# Patient Record
Sex: FEMALE | Race: WHITE | NOT HISPANIC OR LATINO | Employment: FULL TIME | URBAN - METROPOLITAN AREA
[De-identification: names, ages, dates, MRNs, and addresses within clinical notes are randomized per-mention and may not be internally consistent; named-entity substitution may affect disease eponyms.]

---
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
## 2020-06-02 ENCOUNTER — OFFICE VISIT (OUTPATIENT)
Dept: FAMILY MEDICINE | Facility: CLINIC | Age: 20
End: 2020-06-02
Payer: COMMERCIAL

## 2020-06-02 VITALS
OXYGEN SATURATION: 98 % | SYSTOLIC BLOOD PRESSURE: 110 MMHG | BODY MASS INDEX: 29.85 KG/M2 | HEIGHT: 66 IN | WEIGHT: 185.75 LBS | TEMPERATURE: 98 F | DIASTOLIC BLOOD PRESSURE: 76 MMHG | HEART RATE: 58 BPM

## 2020-06-02 DIAGNOSIS — M79.641 PAIN OF RIGHT HAND: Primary | ICD-10-CM

## 2020-06-02 PROCEDURE — 99214 PR OFFICE/OUTPT VISIT, EST, LEVL IV, 30-39 MIN: ICD-10-PCS | Mod: S$GLB,,, | Performed by: NURSE PRACTITIONER

## 2020-06-02 PROCEDURE — 3008F BODY MASS INDEX DOCD: CPT | Mod: CPTII,S$GLB,, | Performed by: NURSE PRACTITIONER

## 2020-06-02 PROCEDURE — 3008F PR BODY MASS INDEX (BMI) DOCUMENTED: ICD-10-PCS | Mod: CPTII,S$GLB,, | Performed by: NURSE PRACTITIONER

## 2020-06-02 PROCEDURE — 99214 OFFICE O/P EST MOD 30 MIN: CPT | Mod: S$GLB,,, | Performed by: NURSE PRACTITIONER

## 2020-06-02 RX ORDER — METHYLPREDNISOLONE 4 MG/1
TABLET ORAL
Qty: 1 PACKAGE | Refills: 0 | Status: SHIPPED | OUTPATIENT
Start: 2020-06-02 | End: 2020-06-23

## 2020-06-02 NOTE — PROGRESS NOTES
"Subjective:       Patient ID: Jessica Womack is a 20 y.o. female.    Chief Complaint: Hand Injury (right)    Hand Pain    The incident occurred more than 1 week ago. The incident occurred at home. Injury mechanism: working out at home. The pain is present in the right wrist and right hand. The quality of the pain is described as aching and shooting. The pain does not radiate. The symptoms are aggravated by movement, lifting and palpation. She has tried NSAIDs and ice for the symptoms.     Review of Systems   Constitutional: Negative for appetite change, chills and fever.   HENT: Negative for ear pain and postnasal drip.    Eyes: Negative for pain and itching.   Respiratory: Negative for chest tightness and shortness of breath.    Gastrointestinal: Negative for abdominal distention and abdominal pain.   Endocrine: Negative for polydipsia and polyuria.   Genitourinary: Negative for difficulty urinating and flank pain.   Musculoskeletal: Positive for arthralgias.   Skin: Negative for color change and pallor.   Neurological: Negative for light-headedness and headaches.   Hematological: Negative for adenopathy. Does not bruise/bleed easily.   Psychiatric/Behavioral: Negative for agitation.       Past medical, surgical, family and social history reviewed.  Objective:     Vitals:    06/02/20 1553   BP: 110/76   Pulse: (!) 58   Temp: 97.5 °F (36.4 °C)   TempSrc: Oral   SpO2: 98%   Weight: 84.2 kg (185 lb 11.8 oz)   Height: 5' 6" (1.676 m)   PainSc: 0-No pain     Body mass index is 29.98 kg/m².     Physical Exam   Constitutional: She is oriented to person, place, and time. She appears well-developed and well-nourished.   HENT:   Head: Normocephalic and atraumatic.   Right Ear: External ear normal.   Left Ear: External ear normal.   Nose: Nose normal.   Mouth/Throat: Oropharynx is clear and moist.   Eyes: Conjunctivae are normal. Right eye exhibits no discharge. Left eye exhibits no discharge. No scleral icterus. "   Neck: Normal range of motion. Neck supple. No tracheal deviation present.   Cardiovascular: Normal rate, regular rhythm and normal heart sounds. Exam reveals no friction rub.   No murmur heard.  Pulmonary/Chest: Effort normal and breath sounds normal. No stridor. No respiratory distress. She has no wheezes. She has no rales. She exhibits no tenderness.   Musculoskeletal: Normal range of motion.        Right wrist: She exhibits tenderness.   Right thumb stiff, pain over wrist with palpation and when flexed   Lymphadenopathy:     She has no cervical adenopathy.   Neurological: She is alert and oriented to person, place, and time.   Skin: Skin is warm and dry.   Psychiatric: She has a normal mood and affect.       Assessment:       1. Pain of right hand        Plan:       Jessica was seen today for hand injury.    Diagnoses and all orders for this visit:    Pain of right hand-most likely a strain from weight-bearing exercises at home.  We will give a Medrol Dosepak and if pain does not resolve she will follow-up with Dr. Thanh Santos    -     methylPREDNISolone (MEDROL DOSEPACK) 4 mg tablet; use as directed  -     Ambulatory referral/consult to Orthopedics; Future

## 2020-07-01 ENCOUNTER — PATIENT OUTREACH (OUTPATIENT)
Dept: ADMINISTRATIVE | Facility: OTHER | Age: 20
End: 2020-07-01

## 2020-07-06 ENCOUNTER — OFFICE VISIT (OUTPATIENT)
Dept: ORTHOPEDICS | Facility: CLINIC | Age: 20
End: 2020-07-06
Payer: COMMERCIAL

## 2020-07-06 VITALS
HEART RATE: 58 BPM | HEIGHT: 66 IN | TEMPERATURE: 99 F | SYSTOLIC BLOOD PRESSURE: 111 MMHG | DIASTOLIC BLOOD PRESSURE: 64 MMHG | BODY MASS INDEX: 28.63 KG/M2 | WEIGHT: 178.13 LBS

## 2020-07-06 DIAGNOSIS — M79.641 PAIN OF RIGHT HAND: ICD-10-CM

## 2020-07-06 DIAGNOSIS — G56.21 ULNAR NEUROPATHY OF RIGHT UPPER EXTREMITY: Primary | ICD-10-CM

## 2020-07-06 PROCEDURE — 99999 PR PBB SHADOW E&M-EST. PATIENT-LVL IV: CPT | Mod: PBBFAC,,, | Performed by: ORTHOPAEDIC SURGERY

## 2020-07-06 PROCEDURE — 3008F PR BODY MASS INDEX (BMI) DOCUMENTED: ICD-10-PCS | Mod: CPTII,S$GLB,, | Performed by: ORTHOPAEDIC SURGERY

## 2020-07-06 PROCEDURE — 99203 OFFICE O/P NEW LOW 30 MIN: CPT | Mod: S$GLB,,, | Performed by: ORTHOPAEDIC SURGERY

## 2020-07-06 PROCEDURE — 3008F BODY MASS INDEX DOCD: CPT | Mod: CPTII,S$GLB,, | Performed by: ORTHOPAEDIC SURGERY

## 2020-07-06 PROCEDURE — 99203 PR OFFICE/OUTPT VISIT, NEW, LEVL III, 30-44 MIN: ICD-10-PCS | Mod: S$GLB,,, | Performed by: ORTHOPAEDIC SURGERY

## 2020-07-06 PROCEDURE — 99999 PR PBB SHADOW E&M-EST. PATIENT-LVL IV: ICD-10-PCS | Mod: PBBFAC,,, | Performed by: ORTHOPAEDIC SURGERY

## 2020-07-06 RX ORDER — CLINDAMYCIN PHOSPHATE 10 MG/ML
SOLUTION TOPICAL
COMMUNITY
Start: 2020-06-23 | End: 2021-03-09

## 2020-07-06 NOTE — LETTER
July 7, 2020      [place]  [address]  [facility] [address]           [facility]  [address] [place] [address]  Phone: [phone]          Patient: Jessica Womack   MR Number: [id number]   YOB: 2000   Date of Visit: 7/6/2020       Dear Camryn Ozuna:    Thank you for referring Jessica Womack to me for evaluation. Attached you will find relevant portions of my assessment and plan of care.    If you have questions, please do not hesitate to call me. I look forward to following Jessica Womack along with you.    Sincerely,    Thanh Santos MD    Enclosure  CC:  No Recipients    If you would like to receive this communication electronically, please contact [email or web address] or [phone] to request more information on [employer] Link access.    For providers and/or their staff who would like to refer a patient to [facility], please contact us through our one-stop-shop provider referral line, [facility] , at [phone].    If you feel you have received this communication in error or would no longer like to receive these types of communications, please e-mail [email or web address]

## 2020-07-06 NOTE — PROGRESS NOTES
7/6/2020    Chief Complaint:  Chief Complaint   Patient presents with    Right Hand Numbness onset 3/2020     pt reports she began experiencing this symptom while working out       HPI:  Jessica Womack is a 20 y.o. female, who presents to clinic today she has history of right ring and small finger numbness and tingling as well as weakness of her small finger.  She states that this began approximately 3-4 months ago.  States that it 1st began after she was working out and putting a lot of pressure on her hand.  She states that initially she had symptoms only with direct pressure but now she was having symptoms more consistently.  She has not had any treatment or evaluation.  She has no other complaints    PMHX:  No past medical history on file.    PSHX:  No past surgical history on file.    FMHX:  No family history on file.    SOCHX:  Social History     Tobacco Use    Smoking status: Never Smoker    Smokeless tobacco: Never Used   Substance Use Topics    Alcohol use: Never     Frequency: Never       ALLERGIES:  Patient has no known allergies.    CURRENT MEDICATIONS:  Current Outpatient Medications on File Prior to Visit   Medication Sig Dispense Refill    clindamycin (CLEOCIN T) 1 % Swab APPLY TO BACK AND CHEST ACNE PRN      clotrimazole-betamethasone 1-0.05% (LOTRISONE) cream NEGRO EXT AA BID  1    fluconazole (DIFLUCAN) 150 MG Tab   0     No current facility-administered medications on file prior to visit.        REVIEW OF SYSTEMS:  Review of Systems   Constitutional: Negative.    HENT: Negative.    Eyes: Negative.    Respiratory: Negative.    Cardiovascular: Negative.    Gastrointestinal: Negative.    Genitourinary: Negative.    Musculoskeletal: Negative.    Skin: Negative.    Neurological: Positive for tingling. Negative for dizziness, tremors, sensory change, speech change, focal weakness, seizures, loss of consciousness, weakness and headaches.   Endo/Heme/Allergies: Negative.   "  Psychiatric/Behavioral: Negative.        GENERAL PHYSICAL EXAM:   /64   Pulse (!) 58   Temp 98.8 °F (37.1 °C)   Ht 5' 6" (1.676 m)   Wt 80.8 kg (178 lb 2.1 oz)   LMP 06/10/2020 (Approximate)   BMI 28.75 kg/m²    GEN: well developed, well nourished, no acute distress   HENT: Normocephalic, atraumatic   EYES: No discharge, conjunctiva normal   NECK: Supple, non-tender   PULM: No wheezing, no respiratory distress   CV: RRR   ABD: Soft, non-tender    ORTHO EXAM:   Examination the right upper extremity reveals that there is no edema.  There are no skin changes.  Palpation produces no tenderness.  She does report decreased sensation in the ulnar distribution.  Median and radial sensation is intact.  She does have mild weakness of the hypothenar musculature but the remainder of the intrinsics appear to be intact.  She has a negative Tinel's overlying the cubital tunnel but a positive Tinel's overlying Guyon's canal.  She does have a 2+ radial pulse.    RADIOLOGY:   None    ASSESSMENT:   Right wrist Guyon's syndrome    PLAN:  1.  I will send the patient for an EMG and nerve conduction study    2.  I have taught her ergonomic changes to prevent compression of the nerve both at the wrist and at the elbow    3.  She will follow up with me as soon as the nerve conduction test is completed for discussion of treatment options      "

## 2020-07-15 ENCOUNTER — OFFICE VISIT (OUTPATIENT)
Dept: URGENT CARE | Facility: CLINIC | Age: 20
End: 2020-07-15
Payer: COMMERCIAL

## 2020-07-15 DIAGNOSIS — U07.1 COVID-19: ICD-10-CM

## 2020-07-15 DIAGNOSIS — Z20.822 CLOSE EXPOSURE TO COVID-19 VIRUS: Primary | ICD-10-CM

## 2020-07-15 PROCEDURE — U0003 INFECTIOUS AGENT DETECTION BY NUCLEIC ACID (DNA OR RNA); SEVERE ACUTE RESPIRATORY SYNDROME CORONAVIRUS 2 (SARS-COV-2) (CORONAVIRUS DISEASE [COVID-19]), AMPLIFIED PROBE TECHNIQUE, MAKING USE OF HIGH THROUGHPUT TECHNOLOGIES AS DESCRIBED BY CMS-2020-01-R: HCPCS

## 2020-07-15 PROCEDURE — 99201 PR OFFICE/OUTPT VISIT,NEW,LEVL I: ICD-10-PCS | Mod: S$GLB,,, | Performed by: FAMILY MEDICINE

## 2020-07-15 PROCEDURE — 99201 PR OFFICE/OUTPT VISIT,NEW,LEVL I: CPT | Mod: S$GLB,,, | Performed by: FAMILY MEDICINE

## 2020-07-15 NOTE — PROGRESS NOTES
Pt here for COVID test.    pt was exposure by [place] yesterday. Counseled patient and answered questions in regards to COVID-19 testing process/procedure and/or diagnosis. Patient v/u

## 2020-07-19 ENCOUNTER — TELEPHONE (OUTPATIENT)
Dept: URGENT CARE | Facility: CLINIC | Age: 20
End: 2020-07-19

## 2020-07-19 LAB — SARS-COV-2 RNA RESP QL NAA+PROBE: NOT DETECTED

## 2020-07-19 NOTE — TELEPHONE ENCOUNTER
Gave pt. Results .SR    ----- Message from Iris So PA-C sent at 7/19/2020  4:10 PM CDT -----  Please call patient in regards to their COVID-19 (NOT DETECTED) results. Please advise patient to continue CDC precautions as the test could be a false negative result because our test is currently only about 80% accurate. Advise patient to practice social distancing and to wear a mask if in close contact and in public to limit any future exposure. If they develop any symptoms, please advise patient to seek medical care.

## 2020-07-19 NOTE — PROGRESS NOTES
Please call patient in regards to their COVID-19 (NOT DETECTED) results. Please advise patient to continue CDC precautions as the test could be a false negative result because our test is currently only about 80% accurate. Advise patient to practice social distancing and to wear a mask if in close contact and in public to limit any future exposure. If they develop any symptoms, please advise patient to seek medical care.

## 2020-07-21 ENCOUNTER — PATIENT OUTREACH (OUTPATIENT)
Dept: ADMINISTRATIVE | Facility: OTHER | Age: 20
End: 2020-07-21

## 2020-07-21 ENCOUNTER — OCCUPATIONAL HEALTH (OUTPATIENT)
Dept: URGENT CARE | Facility: CLINIC | Age: 20
End: 2020-07-21
Payer: COMMERCIAL

## 2020-07-21 DIAGNOSIS — U07.1 COVID-19: ICD-10-CM

## 2020-07-21 PROCEDURE — U0003 INFECTIOUS AGENT DETECTION BY NUCLEIC ACID (DNA OR RNA); SEVERE ACUTE RESPIRATORY SYNDROME CORONAVIRUS 2 (SARS-COV-2) (CORONAVIRUS DISEASE [COVID-19]), AMPLIFIED PROBE TECHNIQUE, MAKING USE OF HIGH THROUGHPUT TECHNOLOGIES AS DESCRIBED BY CMS-2020-01-R: HCPCS

## 2020-07-23 ENCOUNTER — OFFICE VISIT (OUTPATIENT)
Dept: PHYSICAL MEDICINE AND REHAB | Facility: CLINIC | Age: 20
End: 2020-07-23
Payer: COMMERCIAL

## 2020-07-23 DIAGNOSIS — G56.20 ULNAR NEUROPATHY AT ELBOW, UNSPECIFIED LATERALITY: Primary | ICD-10-CM

## 2020-07-23 DIAGNOSIS — G56.03 CARPAL TUNNEL SYNDROME ON BOTH SIDES: ICD-10-CM

## 2020-07-23 LAB — SARS-COV-2 RNA RESP QL NAA+PROBE: NOT DETECTED

## 2020-07-23 PROCEDURE — 99999 PR PBB SHADOW E&M-EST. PATIENT-LVL I: ICD-10-PCS | Mod: PBBFAC,,, | Performed by: PHYSICAL MEDICINE & REHABILITATION

## 2020-07-23 PROCEDURE — 95911 PR NERVE CONDUCTION STUDY; 9-10 STUDIES: ICD-10-PCS | Mod: S$GLB,,, | Performed by: PHYSICAL MEDICINE & REHABILITATION

## 2020-07-23 PROCEDURE — 95911 NRV CNDJ TEST 9-10 STUDIES: CPT | Mod: S$GLB,,, | Performed by: PHYSICAL MEDICINE & REHABILITATION

## 2020-07-23 PROCEDURE — 99999 PR PBB SHADOW E&M-EST. PATIENT-LVL I: CPT | Mod: PBBFAC,,, | Performed by: PHYSICAL MEDICINE & REHABILITATION

## 2020-07-23 PROCEDURE — 95886 PR EMG COMPLETE, W/ NERVE CONDUCTION STUDIES, 5+ MUSCLES: ICD-10-PCS | Mod: S$GLB,,, | Performed by: PHYSICAL MEDICINE & REHABILITATION

## 2020-07-23 PROCEDURE — 95886 MUSC TEST DONE W/N TEST COMP: CPT | Mod: S$GLB,,, | Performed by: PHYSICAL MEDICINE & REHABILITATION

## 2020-07-23 PROCEDURE — 99499 UNLISTED E&M SERVICE: CPT | Mod: S$GLB,,, | Performed by: PHYSICAL MEDICINE & REHABILITATION

## 2020-07-23 PROCEDURE — 99499 NO LOS: ICD-10-PCS | Mod: S$GLB,,, | Performed by: PHYSICAL MEDICINE & REHABILITATION

## 2020-07-23 NOTE — PROGRESS NOTES
[facility]  [address] [facility]  [place] [address]             Full Name: Jessica Womack Gender: Female  Patient ID: [id number] YOB: 2000  History: 20 year-old female referred by Dr. Santos for evaluation of ulnar neuropathy. She reports 4 months of numbness and weakness of her fourth and fifth digits of the bilateral hands R>L which started when weightlifting. There has been no significant change in severity of symptoms since onset. She denies any symptoms in the neck and upper arm.       Visit Date: 7/23/2020 08:40  Age: 20 Years 3 Months Old  Examining Physician: Dr. Lopes  Referring Physician: Dr. Santos  Height: 5 feet 6 inch      Sensory NCS      Nerve / Sites Rec. Site Onset Lat Peak Lat NP Amp PP Amp Segments Distance Velocity     ms ms µV µV  cm m/s   L Median - Digit III (Antidromic)      Wrist Dig III 3.49 4.38 25.7 47.7 Wrist - Dig III 14 40      Mid palm Dig III 1.25 1.82 45.0 79.2 Mid palm - Dig III 7 56   R Median - Digit III (Antidromic)      Wrist Dig III 3.44 4.17 23.9 48.5 Wrist - Dig III 14 41      Mid palm Dig III 1.30 1.77 33.5 65.6 Mid palm - Dig III 7 54   L Ulnar - Digit V (Antidromic)      Wrist Dig V 4.11 5.00 8.4 20.8 Wrist - Dig V 14 34   R Ulnar - Digit V (Antidromic)      Wrist Dig V 4.17 5.73 7.8 14.8 Wrist - Dig V 14 34   R Dorsal ulnar cutaneous - Hand dorsum (Forearm)      Forearm Hand dorsum NR NR NR NR Forearm - Hand dorsum 8 NR   L Dorsal ulnar cutaneous - Hand dorsum (Forearm)      Forearm Hand dorsum 4.79 5.42 18.4 8.6 Forearm - Hand dorsum 8 17       Motor NCS      Nerve / Sites Muscle Latency Amplitude Amp % Duration Segments Distance Lat Diff Velocity     ms mV % ms  cm ms m/s   R Median - APB      Wrist APB 4.84 9.4 100 6.41 Wrist - APB 8        Elbow APB 9.48 9.5 101 6.51 Elbow - Wrist 23 4.64 50   L Median - APB      Wrist APB 4.64 11.6 100 7.19 Wrist - APB 8        Elbow APB 9.01 10.1 87.5 7.34 Elbow - Wrist 23 4.37 53   R Ulnar - ADM       Wrist ADM 3.96 7.4 100 8.65 Wrist - ADM 8        B.Elbow ADM 7.60 7.0 94.2 8.70 B.Elbow - Wrist 18 3.65 49      A.Elbow ADM 10.36 6.0 81 9.06 A.Elbow - B.Elbow 10 2.76 36   L Ulnar - ADM      Wrist ADM 3.96 11.0 100 7.50 Wrist - ADM 8        B.Elbow ADM 6.98 10.5 95.4 7.76 B.Elbow - Wrist 17 3.02 56      A.Elbow ADM 10.89 6.2 56.2 7.66 A.Elbow - B.Elbow 10 3.91 26   R Ulnar - FDI      Wrist FDI 5.26 5.6 100 6.72 Wrist - FDI 11        B.Elbow FDI 9.79 4.8 86.8 5.89 B.Elbow - Wrist 18 4.53 40      A.Elbow FDI 13.02 4.2 75.1 5.63 A.Elbow - B.Elbow 10 3.23 31       EMG         EMG Summary Table     Spontaneous MUAP Recruitment   Muscle IA Fib PSW Fasc H.F. Amp Dur. PPP Pattern   R. Deltoid N None None None None N N N N   R. Biceps brachii N None None None None N N N N   R. Triceps brachii N None None None None N N N N   R. Pronator teres N None None None None N N N N   R. First dorsal interosseous 2+ None 2+ None None 1+ 1+ N N   R. Abductor pollicis brevis N None None None None N N N N   R. Flexor carpi ulnaris 1+ None 1+ None None N N N N   L. First dorsal interosseous 1+ None 1+ None None N 1+ N N       Summary    The motor conduction test had results outside of the specified normal range in all 5 of the tested nerves:   In the R Median - APB study  o the take off latency result was increased for Wrist stimulation   In the L Median - APB study  o the take off latency result was increased for Wrist stimulation   In the R Ulnar - ADM study  o the take off latency result was increased for Wrist stimulation  o the take off velocity result was reduced for A.Elbow - B.Elbow segment   In the L Ulnar - ADM study  o the take off latency result was increased for Wrist stimulation  o the take off velocity result was reduced for A.Elbow - B.Elbow segment   In the R Ulnar - FDI study  o the take off velocity result was reduced for B.Elbow - Wrist segment  o the take off velocity result was reduced for A.Elbow - B.Elbow  segment    The sensory conduction test had results outside of the specified normal range in all 6 of the tested nerves:   In the L Median - Digit III (Antidromic) study  o the peak latency result was increased for Wrist stimulation   In the R Median - Digit III (Antidromic) study  o the peak latency result was increased for Wrist stimulation   In the L Ulnar - Digit V (Antidromic) study  o the peak latency result was increased for Wrist stimulation  o the peak amplitude result was reduced for Wrist stimulation   In the R Ulnar - Digit V (Antidromic) study  o the peak latency result was increased for Wrist stimulation  o the peak amplitude result was reduced for Wrist stimulation   In the R Dorsal ulnar cutaneous - Hand dorsum (Forearm) study  o the response was considered absent for Forearm stimulation   In the L Dorsal ulnar cutaneous - Hand dorsum (Forearm) study  o the peak latency result was increased for Forearm stimulation    The needle EMG examination was performed in 8 muscles. It was normal in 5 muscle(s): R. Deltoid, R. Biceps brachii, R. Triceps brachii, R. Pronator teres, R. Abductor pollicis brevis. The study was abnormal in 3 muscle(s), with the following distribution:   Abnormal spontaneous/insertional activity was found in R. First dorsal interosseous, R. Flexor carpi ulnaris, L. First dorsal interosseous.   The MUP waveform abnormality was found in R. First dorsal interosseous, L. First dorsal interosseous.      Electrodiagnostic Impression:  1. There is electrodiagnostic evidence of mild to moderate bilateral median nerve neuropathy at both wrists. Needle EMG examination of the median nerve innervated abductor pollicis brevis muscle on the right failed to show signs of active axonal denervation.  2. There is also evidence of right greater than left, axonal and demyelinating ulnar neuropathy at both elbows.  Needle EMG examination of the ulnar nerve innervated 1st dorsal interosseous muscle  on both sides did show signs of active axonal denervation, and chronic neurogenic change.  3. The presence of significant ulnar neuropathy at the elbow does increase the technical challenge in terms of discerning for overlapping/concurrent ulnar neuropathy at the wrists.  As such, the presence or absence of distal ulnar neuropathy at the wrists is unable to be determined on today's electrodiagnostic testing.  4. There was insufficient electrodiagnostic evidence for diagnoses of myopathy or active axonal cervical radiculopathy/brachial plexopathy of the right upper extremity.    Summary:  1. Mild to moderate bilateral carpal tunnel syndrome.  2. Bilateral ulnar neuropathy at the elbows with active axonal denervation.    Plan:  Today's test results will be sent to her referring physician, Dr. Santos, for further review and direction in her treatment.    Thank you very much for the referral. Please call if you have any questions regarding this study or the report.       -------------------------------  Aleks Lopes M.D.

## 2020-07-24 ENCOUNTER — TELEPHONE (OUTPATIENT)
Dept: URGENT CARE | Facility: CLINIC | Age: 20
End: 2020-07-24

## 2020-07-24 NOTE — TELEPHONE ENCOUNTER
Pt reviewed test results via [facility]        Would Advise parent/patient that any direct exposure to a positive patient within the last 14 days coupled with their presentation of current symptoms, OR if no current symptoms at the time of testing, BUT were to develop sx after testing, regardless if symptoms are seemingly mild in nature- can assume a current covid infection is likely, regardless of results as there can be false negative testing. Patient should take proper precautions for any in-household exposed family members(which includes wearing a mask inside around uninfected family members) and the potential home quarantine for up to 14 days as discussed in clinic. Should follow current CDC recs of wearing facemask and social awareness if in public to limit any potential transmission to others. .

## 2020-07-27 ENCOUNTER — OFFICE VISIT (OUTPATIENT)
Dept: ORTHOPEDICS | Facility: CLINIC | Age: 20
End: 2020-07-27
Payer: COMMERCIAL

## 2020-07-27 VITALS
SYSTOLIC BLOOD PRESSURE: 109 MMHG | DIASTOLIC BLOOD PRESSURE: 65 MMHG | BODY MASS INDEX: 28.63 KG/M2 | HEIGHT: 66 IN | WEIGHT: 178.13 LBS

## 2020-07-27 DIAGNOSIS — G56.23 CUBITAL TUNNEL SYNDROME, BILATERAL: ICD-10-CM

## 2020-07-27 DIAGNOSIS — G56.03 CARPAL TUNNEL SYNDROME, BILATERAL: Primary | ICD-10-CM

## 2020-07-27 PROCEDURE — 99213 PR OFFICE/OUTPT VISIT, EST, LEVL III, 20-29 MIN: ICD-10-PCS | Mod: S$GLB,,, | Performed by: ORTHOPAEDIC SURGERY

## 2020-07-27 PROCEDURE — 99213 OFFICE O/P EST LOW 20 MIN: CPT | Mod: S$GLB,,, | Performed by: ORTHOPAEDIC SURGERY

## 2020-07-27 PROCEDURE — 99999 PR PBB SHADOW E&M-EST. PATIENT-LVL III: CPT | Mod: PBBFAC,,, | Performed by: ORTHOPAEDIC SURGERY

## 2020-07-27 PROCEDURE — 3008F BODY MASS INDEX DOCD: CPT | Mod: CPTII,S$GLB,, | Performed by: ORTHOPAEDIC SURGERY

## 2020-07-27 PROCEDURE — 3008F PR BODY MASS INDEX (BMI) DOCUMENTED: ICD-10-PCS | Mod: CPTII,S$GLB,, | Performed by: ORTHOPAEDIC SURGERY

## 2020-07-27 PROCEDURE — 99999 PR PBB SHADOW E&M-EST. PATIENT-LVL III: ICD-10-PCS | Mod: PBBFAC,,, | Performed by: ORTHOPAEDIC SURGERY

## 2020-07-27 NOTE — PROGRESS NOTES
Thelma returns to clinic today.  She has a history of bilateral hand numbness.  She states that the left was worse than the right and that her ring and small fingers were more affected.  She also has had some weakness in her hand.  She states that this all began after she began working out in March of this year.  She was sent for nerve conduction study at her last visit is here today for follow-up.    Physical exam:  Examination of both the right and left upper extremities reveals that there is no edema.  There are no skin changes.  Palpation produces no tenderness.  She does report intact sensation in the median radial ulnar distributions bilaterally.  She has negative Tinel's over the carpal and cubital tunnels.  She has negative Durkan's test today.  She does have 4 to 5/5 strength of the hypothenar musculature.  The thumb adductor strength is 5/5.  She does have 2+ radial pulses.    EMG/nerve conduction studies:  Nerve conduction study has been reviewed.  She is noted to have bilateral mild carpal tunnel syndrome as well as evidence of ulnar nerve compression at the elbows bilaterally.    Assessment:  Bilateral carpal tunnel and cubital tunnel syndromes    Plan:    1.  I will start her on Mobic 15 mg per day    2.  She will begin nighttime splinting and we will provide her with cock-up splints    3.  She will begin activity avoidance to eliminate the offending exercises that have been causing her numbness and tingling as well as to limit the pressure on her elbows for daily activities    4.  She will follow up with me in 6 weeks for repeat evaluation

## 2020-07-30 ENCOUNTER — TELEPHONE (OUTPATIENT)
Dept: ORTHOPEDICS | Facility: CLINIC | Age: 20
End: 2020-07-30

## 2020-07-30 NOTE — TELEPHONE ENCOUNTER
Dr. Danielle bellamy:  Call in meloxicam 15mg, take one by mouth once daily, dispense 30 with zero refills.    Called [facility] prescription to [facility].

## 2020-07-30 NOTE — TELEPHONE ENCOUNTER
----- Message from Neha Jha sent at 7/30/2020 10:35 AM CDT -----  Regarding: refill  Contact: Jessica paul  Type: Needs Medical Advice  Who Called:  Jessica paul  Pharmacy name and phone #:    [id number] - [place] [address] [facility] [address] [facility] [address] [facility] [address] [place] [id number]  Phone: [phone] Fax: [phone]      Best Call Back Number: [phone]  Additional Information: Pt adv'd  was supposed to call in a prescription for her but has not. She didn't know the name

## 2020-08-13 LAB — PAP SMEAR: NORMAL

## 2020-09-06 ENCOUNTER — PATIENT OUTREACH (OUTPATIENT)
Dept: ADMINISTRATIVE | Facility: OTHER | Age: 20
End: 2020-09-06

## 2020-09-07 NOTE — PROGRESS NOTES
LINKS immunization registry updated  Care Everywhere updated  Health Maintenance updated  Chart reviewed for overdue [facility] ([facility]) health maintenance testing (CRS, Breast Ca, Diabetic Eye Exam)   Orders entered:N/A

## 2020-12-06 ENCOUNTER — CLINICAL SUPPORT (OUTPATIENT)
Dept: URGENT CARE | Facility: CLINIC | Age: 20
End: 2020-12-06
Payer: COMMERCIAL

## 2020-12-06 DIAGNOSIS — Z11.52 ENCOUNTER FOR SCREENING LABORATORY TESTING FOR COVID-19 VIRUS: Primary | ICD-10-CM

## 2020-12-06 LAB
CTP QC/QA: YES
SARS-COV-2 RDRP RESP QL NAA+PROBE: NEGATIVE

## 2020-12-06 PROCEDURE — U0002: ICD-10-PCS | Mod: QW,S$GLB,, | Performed by: FAMILY MEDICINE

## 2020-12-06 PROCEDURE — U0002 COVID-19 LAB TEST NON-CDC: HCPCS | Mod: QW,S$GLB,, | Performed by: FAMILY MEDICINE

## 2020-12-06 NOTE — LETTER
December 6, 2020      [facility]  [address], [address]  [place] [address]  Phone: [phone]  Fax: [phone]       Patient: Jessica Womack   YOB: 2000  Date of Visit: 12/06/2020    To Whom It May Concern:    Opal Womack  was at [facility] on 12/06/2020. If patient has been tested for COVID19:    --IF Test results are NEGATIVE and NO known high risk exposure to covid-19 virus, can exclude from work/school until:  o MINIMUM OF 24 hours fever-free without the use of fever-reducing medications AND  o Improvement in symptoms (e.g. cough, shortness of breath, fatigue, GI symptoms, etc)     --IF YOU ARE BEING TESTED BECAUSE OF A HIGH RISK EXPOSURE, which the CDC defines as if masked or unmasked:  o  You were within 6 feet of someone who has COVID-19 for a total of 15 minutes or more  o  You provided care at home to someone who is sick with COVID-19  o  You had direct physical contact with the person (hugged or kissed them)  o  You shared eating or drinking utensils  o  They sneezed, coughed, or somehow got respiratory droplets on you       You should follow CDC guidelines as well as your employer/school protocols for safely returning to work/school. Please be aware that there are False Negative possibilities with testing and you should return to work based upon CDC guidelines, not simply a negative result, unless your employer/school has a different RTW protocol/guidance for you. If you are able to return to work, you should still quarantine outside of work based on CDC guidance as we discussed as 14 days from date of exposure.  * **If you developed symptoms while in quarantine, and your test was negative today, you should still quarantine for 14 days* **.     ** ** o Household contacts of positive individuals are to quarantine for 14 days from last exposure. For example, if you are unable to isolate from a family member, per CDC guidance your  family member is to be under quarantine for up to 24 days since the last day of exposure is day 10 of your contagious period. ** **      Sincerely,    Alonso Avery MA

## 2020-12-06 NOTE — PROGRESS NOTES
-CDC Testing and Quarantine Guidelines for patients with exposure to a known-positive COVID-19 person:  A close exposure is defined as anyone who has had an exposure (masked or unmasked) to a known COVID -19 positive person within 6 ft for longer than 15 minutes. If your exposure meets this definition you are required by CDC guidelines to quarantine for at least 7-10 days from time of exposure. The CDC states that a test can be performed for an asymptomatic patient (someone who does not have any symptoms) after a close exposure, and that a test should be done if you develop symptoms after a close exposure as described above.  Specifically, you can test at day 5 or later if asymptomatic, in order to get released from quarantine on day 7 or later.  If you develop symptoms sooner, you should test when your symptoms start.  -If you meet the definition of a close exposure, it will not matter whether you are experiencing symptoms- a quarantine for at least 7-10 days after a close exposure is required by CDC guidelines.  -Please note, if you decide to test as an asymptomatic during your quarantine and you are positive, you will be restarting your quarantine and moving from a possible 10 day quarantine (if you do not test), to a 11 day or greater quarantine.  -The CDC also suggests people still monitor for symptoms for a full 14 days and remember that the shorter quarantine options do not replace initial CDC guidance.  The CDC continues to recommend quarantining for 14 days as the best way to reduce risk for spreading COVID-19 - however, this is only a recommendation.  -If your exposure does not meet the above definition, you can return to your normal daily activities to include social distancing, wearing a mask and frequent handwashing.

## 2020-12-06 NOTE — PATIENT INSTRUCTIONS
If patient has been tested for COVID19:    --IF Test results are NEGATIVE and NO known high risk exposure to covid-19 virus, can exclude from work/school until:  o MINIMUM OF 24 hours fever-free without the use of fever-reducing medications AND  o Improvement in symptoms (e.g. cough, shortness of breath, fatigue, GI symptoms, etc)     --IF YOU ARE BEING TESTED BECAUSE OF A HIGH RISK EXPOSURE, which the CDC defines as if masked or unmasked:  o  You were within 6 feet of someone who has COVID-19 for a total of 15 minutes or more  o  You provided care at home to someone who is sick with COVID-19  o  You had direct physical contact with the person (hugged or kissed them)  o  You shared eating or drinking utensils  o  They sneezed, coughed, or somehow got respiratory droplets on you       You should follow CDC guidelines as well as your employer/school protocols for safely returning to work/school. Please be aware that there are False Negative possibilities with testing and you should return to work based upon CDC guidelines, not simply a negative result, unless your employer/school has a different RTW protocol/guidance for you. If you are able to return to work, you should still quarantine outside of work based on CDC guidance as we discussed as 14 days from date of exposure.  * **If you developed symptoms while in quarantine, and your test was negative today, you should still quarantine for 14 days* **.     ** ** o Household contacts of positive individuals are to quarantine for 14 days from last exposure. For example, if you are unable to isolate from a family member, per CDC guidance your family member is to be under quarantine for up to 24 days since the last day of exposure is day 10 of your contagious period. ** **

## 2020-12-12 ENCOUNTER — OCCUPATIONAL HEALTH (OUTPATIENT)
Dept: URGENT CARE | Facility: CLINIC | Age: 20
End: 2020-12-12
Payer: COMMERCIAL

## 2020-12-12 DIAGNOSIS — Z20.822 ENCOUNTER FOR LABORATORY TESTING FOR COVID-19 VIRUS: Primary | ICD-10-CM

## 2020-12-12 LAB
CTP QC/QA: YES
SARS-COV-2 RDRP RESP QL NAA+PROBE: NEGATIVE

## 2020-12-12 PROCEDURE — U0002: ICD-10-PCS | Mod: QW,S$GLB,, | Performed by: FAMILY MEDICINE

## 2020-12-12 PROCEDURE — U0002 COVID-19 LAB TEST NON-CDC: HCPCS | Mod: QW,S$GLB,, | Performed by: FAMILY MEDICINE

## 2021-01-04 ENCOUNTER — OCCUPATIONAL HEALTH (OUTPATIENT)
Dept: URGENT CARE | Facility: CLINIC | Age: 21
End: 2021-01-04

## 2021-01-04 DIAGNOSIS — Z20.822 ENCOUNTER FOR LABORATORY TESTING FOR COVID-19 VIRUS: Primary | ICD-10-CM

## 2021-01-04 LAB
CTP QC/QA: YES
SARS-COV-2 RDRP RESP QL NAA+PROBE: NEGATIVE

## 2021-01-04 PROCEDURE — 99199 CV19 SPECIMEN HANDLING FEE / SWAB: ICD-10-PCS | Mod: S$GLB,,, | Performed by: FAMILY MEDICINE

## 2021-01-04 PROCEDURE — U0002: ICD-10-PCS | Mod: QW,S$GLB,, | Performed by: FAMILY MEDICINE

## 2021-01-04 PROCEDURE — U0002 COVID-19 LAB TEST NON-CDC: HCPCS | Mod: QW,S$GLB,, | Performed by: FAMILY MEDICINE

## 2021-01-04 PROCEDURE — 99199 UNLISTED SPECIAL SVC PX/RPRT: CPT | Mod: S$GLB,,, | Performed by: FAMILY MEDICINE

## 2021-01-11 NOTE — LETTER
July 23, 2020      Thanh Santos MD  [address] [facility] [address] [place] [address]           [facility]  [address] [place] [address]  Phone: [phone]  Fax: [phone]          Patient: Jessica Womack   MR Number: [id number]   YOB: 2000   Date of Visit: 7/23/2020       Dear Dr. Thanh Santos:    Thank you for referring Jessica Womack to me for evaluation. Attached you will find relevant portions of my assessment and plan of care.    If you have questions, please do not hesitate to call me. I look forward to following Jessica Womack along with you.    Sincerely,    Aleks Lopes MD    Enclosure  CC:  No Recipients    If you would like to receive this communication electronically, please contact [email or web address] or [phone] to request more information on [facility] Link access.    For providers and/or their staff who would like to refer a patient to [facility], please contact us through our one-stop-shop provider referral line, [facility], at [phone].    If you feel you have received this communication in error or would no longer like to receive these types of communications, please e-mail [email or web address]          Mom called stating that her fiance tested positive for covid, and the baby has been around him mom states that baby has no symptoms and dad just started having symptoms yesterday  Mom questions if Armand Bearded should be tested  Advised mom that not necessarily, quarantine is still 20 days from dad having symptoms regardless of what his result would be  Mom would call back with any questions and if she would like him tested then she will call back       HERIBERTO Apple

## 2021-02-03 ENCOUNTER — TELEPHONE (OUTPATIENT)
Dept: FAMILY MEDICINE | Facility: CLINIC | Age: 21
End: 2021-02-03

## 2021-02-03 DIAGNOSIS — R53.83 FATIGUE, UNSPECIFIED TYPE: Primary | ICD-10-CM

## 2021-02-06 ENCOUNTER — PATIENT MESSAGE (OUTPATIENT)
Dept: FAMILY MEDICINE | Facility: CLINIC | Age: 21
End: 2021-02-06

## 2021-02-08 ENCOUNTER — LAB VISIT (OUTPATIENT)
Dept: LAB | Facility: HOSPITAL | Age: 21
End: 2021-02-08
Attending: FAMILY MEDICINE
Payer: COMMERCIAL

## 2021-02-08 DIAGNOSIS — R53.83 FATIGUE, UNSPECIFIED TYPE: ICD-10-CM

## 2021-02-08 LAB
BASOPHILS # BLD AUTO: 0.03 K/UL (ref 0–0.2)
BASOPHILS NFR BLD: 0.4 % (ref 0–1.9)
DIFFERENTIAL METHOD: NORMAL
EOSINOPHIL # BLD AUTO: 0.2 K/UL (ref 0–0.5)
EOSINOPHIL NFR BLD: 2.4 % (ref 0–8)
ERYTHROCYTE [DISTWIDTH] IN BLOOD BY AUTOMATED COUNT: 11.9 % (ref 11.5–14.5)
HCT VFR BLD AUTO: 39.4 % (ref 37–48.5)
HGB BLD-MCNC: 12.8 G/DL (ref 12–16)
IMM GRANULOCYTES # BLD AUTO: 0.02 K/UL (ref 0–0.04)
IMM GRANULOCYTES NFR BLD AUTO: 0.3 % (ref 0–0.5)
LYMPHOCYTES # BLD AUTO: 2.5 K/UL (ref 1–4.8)
LYMPHOCYTES NFR BLD: 34 % (ref 18–48)
MCH RBC QN AUTO: 30.5 PG (ref 27–31)
MCHC RBC AUTO-ENTMCNC: 32.5 G/DL (ref 32–36)
MCV RBC AUTO: 94 FL (ref 82–98)
MONOCYTES # BLD AUTO: 0.6 K/UL (ref 0.3–1)
MONOCYTES NFR BLD: 8.6 % (ref 4–15)
NEUTROPHILS # BLD AUTO: 3.9 K/UL (ref 1.8–7.7)
NEUTROPHILS NFR BLD: 54.3 % (ref 38–73)
NRBC BLD-RTO: 0 /100 WBC
PLATELET # BLD AUTO: 231 K/UL (ref 150–350)
PMV BLD AUTO: 11 FL (ref 9.2–12.9)
RBC # BLD AUTO: 4.19 M/UL (ref 4–5.4)
WBC # BLD AUTO: 7.23 K/UL (ref 3.9–12.7)

## 2021-02-08 PROCEDURE — 36415 COLL VENOUS BLD VENIPUNCTURE: CPT | Mod: PO

## 2021-02-08 PROCEDURE — 85025 COMPLETE CBC W/AUTO DIFF WBC: CPT

## 2021-02-08 PROCEDURE — 83540 ASSAY OF IRON: CPT

## 2021-02-08 PROCEDURE — 84443 ASSAY THYROID STIM HORMONE: CPT

## 2021-02-08 PROCEDURE — 80053 COMPREHEN METABOLIC PANEL: CPT

## 2021-02-09 LAB
ALBUMIN SERPL BCP-MCNC: 3.9 G/DL (ref 3.5–5.2)
ALP SERPL-CCNC: 50 U/L (ref 55–135)
ALT SERPL W/O P-5'-P-CCNC: 11 U/L (ref 10–44)
ANION GAP SERPL CALC-SCNC: 10 MMOL/L (ref 8–16)
AST SERPL-CCNC: 17 U/L (ref 10–40)
BILIRUB SERPL-MCNC: 0.7 MG/DL (ref 0.1–1)
BUN SERPL-MCNC: 7 MG/DL (ref 6–20)
CALCIUM SERPL-MCNC: 9.1 MG/DL (ref 8.7–10.5)
CHLORIDE SERPL-SCNC: 106 MMOL/L (ref 95–110)
CO2 SERPL-SCNC: 23 MMOL/L (ref 23–29)
CREAT SERPL-MCNC: 0.8 MG/DL (ref 0.5–1.4)
EST. GFR  (AFRICAN AMERICAN): >60 ML/MIN/1.73 M^2
EST. GFR  (NON AFRICAN AMERICAN): >60 ML/MIN/1.73 M^2
GLUCOSE SERPL-MCNC: 75 MG/DL (ref 70–110)
IRON SERPL-MCNC: 40 UG/DL (ref 30–160)
POTASSIUM SERPL-SCNC: 4.4 MMOL/L (ref 3.5–5.1)
PROT SERPL-MCNC: 6.9 G/DL (ref 6–8.4)
SATURATED IRON: 11 % (ref 20–50)
SODIUM SERPL-SCNC: 139 MMOL/L (ref 136–145)
TOTAL IRON BINDING CAPACITY: 364 UG/DL (ref 250–450)
TRANSFERRIN SERPL-MCNC: 246 MG/DL (ref 200–375)
TSH SERPL DL<=0.005 MIU/L-ACNC: 0.66 UIU/ML (ref 0.4–4)

## 2021-02-21 ENCOUNTER — PATIENT MESSAGE (OUTPATIENT)
Dept: FAMILY MEDICINE | Facility: CLINIC | Age: 21
End: 2021-02-21

## 2021-03-09 ENCOUNTER — OFFICE VISIT (OUTPATIENT)
Dept: FAMILY MEDICINE | Facility: CLINIC | Age: 21
End: 2021-03-09
Payer: COMMERCIAL

## 2021-03-09 VITALS
HEART RATE: 61 BPM | WEIGHT: 179.81 LBS | RESPIRATION RATE: 18 BRPM | DIASTOLIC BLOOD PRESSURE: 70 MMHG | TEMPERATURE: 98 F | HEIGHT: 66 IN | SYSTOLIC BLOOD PRESSURE: 110 MMHG | BODY MASS INDEX: 28.9 KG/M2

## 2021-03-09 DIAGNOSIS — F32.A DEPRESSION, UNSPECIFIED DEPRESSION TYPE: ICD-10-CM

## 2021-03-09 DIAGNOSIS — R53.83 FATIGUE, UNSPECIFIED TYPE: Primary | ICD-10-CM

## 2021-03-09 PROCEDURE — 99214 PR OFFICE/OUTPT VISIT, EST, LEVL IV, 30-39 MIN: ICD-10-PCS | Mod: S$GLB,,, | Performed by: FAMILY MEDICINE

## 2021-03-09 PROCEDURE — 1126F AMNT PAIN NOTED NONE PRSNT: CPT | Mod: S$GLB,,, | Performed by: FAMILY MEDICINE

## 2021-03-09 PROCEDURE — 1126F PR PAIN SEVERITY QUANTIFIED, NO PAIN PRESENT: ICD-10-PCS | Mod: S$GLB,,, | Performed by: FAMILY MEDICINE

## 2021-03-09 PROCEDURE — 3008F BODY MASS INDEX DOCD: CPT | Mod: CPTII,S$GLB,, | Performed by: FAMILY MEDICINE

## 2021-03-09 PROCEDURE — 99214 OFFICE O/P EST MOD 30 MIN: CPT | Mod: S$GLB,,, | Performed by: FAMILY MEDICINE

## 2021-03-09 PROCEDURE — 3008F PR BODY MASS INDEX (BMI) DOCUMENTED: ICD-10-PCS | Mod: CPTII,S$GLB,, | Performed by: FAMILY MEDICINE

## 2021-03-09 RX ORDER — VENLAFAXINE HYDROCHLORIDE 75 MG/1
75 CAPSULE, EXTENDED RELEASE ORAL DAILY
Qty: 30 CAPSULE | Refills: 1 | Status: SHIPPED | OUTPATIENT
Start: 2021-03-09 | End: 2021-04-20

## 2021-03-09 RX ORDER — LEVONORGESTREL AND ETHINYL ESTRADIOL 0.1-0.02MG
1 KIT ORAL DAILY
COMMUNITY
Start: 2021-03-08 | End: 2021-04-20

## 2021-03-20 ENCOUNTER — PATIENT MESSAGE (OUTPATIENT)
Dept: FAMILY MEDICINE | Facility: CLINIC | Age: 21
End: 2021-03-20

## 2021-03-20 ENCOUNTER — TELEPHONE (OUTPATIENT)
Dept: FAMILY MEDICINE | Facility: CLINIC | Age: 21
End: 2021-03-20

## 2021-04-20 ENCOUNTER — OFFICE VISIT (OUTPATIENT)
Dept: FAMILY MEDICINE | Facility: CLINIC | Age: 21
End: 2021-04-20
Payer: COMMERCIAL

## 2021-04-20 VITALS
BODY MASS INDEX: 28.51 KG/M2 | RESPIRATION RATE: 18 BRPM | WEIGHT: 177.38 LBS | HEIGHT: 66 IN | DIASTOLIC BLOOD PRESSURE: 72 MMHG | TEMPERATURE: 98 F | SYSTOLIC BLOOD PRESSURE: 114 MMHG | HEART RATE: 60 BPM

## 2021-04-20 DIAGNOSIS — F32.A DEPRESSION, UNSPECIFIED DEPRESSION TYPE: Primary | ICD-10-CM

## 2021-04-20 PROCEDURE — 1126F AMNT PAIN NOTED NONE PRSNT: CPT | Mod: S$GLB,,, | Performed by: FAMILY MEDICINE

## 2021-04-20 PROCEDURE — 99213 PR OFFICE/OUTPT VISIT, EST, LEVL III, 20-29 MIN: ICD-10-PCS | Mod: S$GLB,,, | Performed by: FAMILY MEDICINE

## 2021-04-20 PROCEDURE — 99213 OFFICE O/P EST LOW 20 MIN: CPT | Mod: S$GLB,,, | Performed by: FAMILY MEDICINE

## 2021-04-20 PROCEDURE — 1126F PR PAIN SEVERITY QUANTIFIED, NO PAIN PRESENT: ICD-10-PCS | Mod: S$GLB,,, | Performed by: FAMILY MEDICINE

## 2021-04-20 PROCEDURE — 3008F PR BODY MASS INDEX (BMI) DOCUMENTED: ICD-10-PCS | Mod: CPTII,S$GLB,, | Performed by: FAMILY MEDICINE

## 2021-04-20 PROCEDURE — 3008F BODY MASS INDEX DOCD: CPT | Mod: CPTII,S$GLB,, | Performed by: FAMILY MEDICINE

## 2021-04-29 ENCOUNTER — PATIENT MESSAGE (OUTPATIENT)
Dept: RESEARCH | Facility: HOSPITAL | Age: 21
End: 2021-04-29

## 2021-05-19 ENCOUNTER — TELEPHONE (OUTPATIENT)
Dept: PSYCHIATRY | Facility: CLINIC | Age: 21
End: 2021-05-19

## 2021-05-19 ENCOUNTER — PATIENT MESSAGE (OUTPATIENT)
Dept: PSYCHIATRY | Facility: CLINIC | Age: 21
End: 2021-05-19

## 2021-07-07 ENCOUNTER — PATIENT MESSAGE (OUTPATIENT)
Dept: ADMINISTRATIVE | Facility: HOSPITAL | Age: 21
End: 2021-07-07

## 2021-07-07 ENCOUNTER — PATIENT OUTREACH (OUTPATIENT)
Dept: ADMINISTRATIVE | Facility: HOSPITAL | Age: 21
End: 2021-07-07

## 2021-11-16 ENCOUNTER — PATIENT OUTREACH (OUTPATIENT)
Dept: ADMINISTRATIVE | Facility: HOSPITAL | Age: 21
End: 2021-11-16
Payer: COMMERCIAL

## 2021-11-22 ENCOUNTER — PATIENT OUTREACH (OUTPATIENT)
Dept: ADMINISTRATIVE | Facility: HOSPITAL | Age: 21
End: 2021-11-22
Payer: COMMERCIAL

## 2021-12-20 ENCOUNTER — PATIENT MESSAGE (OUTPATIENT)
Dept: FAMILY MEDICINE | Facility: CLINIC | Age: 21
End: 2021-12-20
Payer: COMMERCIAL

## 2021-12-20 ENCOUNTER — TELEPHONE (OUTPATIENT)
Dept: FAMILY MEDICINE | Facility: CLINIC | Age: 21
End: 2021-12-20
Payer: COMMERCIAL

## 2022-05-31 ENCOUNTER — PATIENT MESSAGE (OUTPATIENT)
Dept: ADMINISTRATIVE | Facility: HOSPITAL | Age: 22
End: 2022-05-31
Payer: COMMERCIAL

## 2023-02-22 ENCOUNTER — TELEPHONE (OUTPATIENT)
Dept: FAMILY MEDICINE | Facility: CLINIC | Age: 23
End: 2023-02-22
Payer: COMMERCIAL

## 2023-02-22 NOTE — TELEPHONE ENCOUNTER
----- Message from Paty Lira sent at 2/22/2023 12:03 PM CST -----  Regarding: pt called  Name of Who is Calling: GLENDA MARTE [[id number]]      What is the request in detail: pt is requesting to speak with a nurse about getting the Dr to order her a MRI before seeing the neurologist. Please advise       Can the clinic reply by [facility]: No      What Number to Call Back if not [facility]: Telephone Information:  [place]          [phone]

## 2023-02-27 ENCOUNTER — OFFICE VISIT (OUTPATIENT)
Dept: FAMILY MEDICINE | Facility: CLINIC | Age: 23
End: 2023-02-27
Payer: COMMERCIAL

## 2023-02-27 VITALS
OXYGEN SATURATION: 99 % | BODY MASS INDEX: 26.5 KG/M2 | HEIGHT: 66 IN | DIASTOLIC BLOOD PRESSURE: 68 MMHG | RESPIRATION RATE: 16 BRPM | HEART RATE: 60 BPM | TEMPERATURE: 98 F | WEIGHT: 164.88 LBS | SYSTOLIC BLOOD PRESSURE: 102 MMHG

## 2023-02-27 DIAGNOSIS — N30.00 ACUTE CYSTITIS WITHOUT HEMATURIA: ICD-10-CM

## 2023-02-27 DIAGNOSIS — M54.12 CERVICAL RADICULOPATHY AT C6: Primary | ICD-10-CM

## 2023-02-27 PROCEDURE — 1159F PR MEDICATION LIST DOCUMENTED IN MEDICAL RECORD: ICD-10-PCS | Mod: CPTII,S$GLB,, | Performed by: FAMILY MEDICINE

## 2023-02-27 PROCEDURE — 99214 PR OFFICE/OUTPT VISIT, EST, LEVL IV, 30-39 MIN: ICD-10-PCS | Mod: S$GLB,,, | Performed by: FAMILY MEDICINE

## 2023-02-27 PROCEDURE — 1160F RVW MEDS BY RX/DR IN RCRD: CPT | Mod: CPTII,S$GLB,, | Performed by: FAMILY MEDICINE

## 2023-02-27 PROCEDURE — 3008F PR BODY MASS INDEX (BMI) DOCUMENTED: ICD-10-PCS | Mod: CPTII,S$GLB,, | Performed by: FAMILY MEDICINE

## 2023-02-27 PROCEDURE — 3078F PR MOST RECENT DIASTOLIC BLOOD PRESSURE < 80 MM HG: ICD-10-PCS | Mod: CPTII,S$GLB,, | Performed by: FAMILY MEDICINE

## 2023-02-27 PROCEDURE — 3074F SYST BP LT 130 MM HG: CPT | Mod: CPTII,S$GLB,, | Performed by: FAMILY MEDICINE

## 2023-02-27 PROCEDURE — 3078F DIAST BP <80 MM HG: CPT | Mod: CPTII,S$GLB,, | Performed by: FAMILY MEDICINE

## 2023-02-27 PROCEDURE — 3008F BODY MASS INDEX DOCD: CPT | Mod: CPTII,S$GLB,, | Performed by: FAMILY MEDICINE

## 2023-02-27 PROCEDURE — 3074F PR MOST RECENT SYSTOLIC BLOOD PRESSURE < 130 MM HG: ICD-10-PCS | Mod: CPTII,S$GLB,, | Performed by: FAMILY MEDICINE

## 2023-02-27 PROCEDURE — 1159F MED LIST DOCD IN RCRD: CPT | Mod: CPTII,S$GLB,, | Performed by: FAMILY MEDICINE

## 2023-02-27 PROCEDURE — 99214 OFFICE O/P EST MOD 30 MIN: CPT | Mod: S$GLB,,, | Performed by: FAMILY MEDICINE

## 2023-02-27 PROCEDURE — 1160F PR REVIEW ALL MEDS BY PRESCRIBER/CLIN PHARMACIST DOCUMENTED: ICD-10-PCS | Mod: CPTII,S$GLB,, | Performed by: FAMILY MEDICINE

## 2023-02-27 RX ORDER — SULFAMETHOXAZOLE AND TRIMETHOPRIM 800; 160 MG/1; MG/1
1 TABLET ORAL 2 TIMES DAILY
Qty: 6 TABLET | Refills: 0 | Status: SHIPPED | OUTPATIENT
Start: 2023-02-27 | End: 2023-03-02

## 2023-02-27 RX ORDER — ALPRAZOLAM 0.5 MG/1
0.5 TABLET ORAL ONCE AS NEEDED
Qty: 2 TABLET | Refills: 0 | Status: SHIPPED | OUTPATIENT
Start: 2023-02-27 | End: 2023-02-27

## 2023-02-27 NOTE — PROGRESS NOTES
Subjective:       Patient ID: Jessica Womack is a 22 y.o. female.    Chief Complaint: Arm Pain (Arm numbmenss)    HPI  The patient is a 22-year-old who is here today with right arm numbness.  On February 6, she was working out in the gym.  With the workout, she had a barbell across the base of her neck and was doing squats with this.  During the set, she felt something weird in her right arm but continued.  After this set, she noticed that her right arm was numb and tingling.  The numbness and tingling extends down from her right shoulder into her right thumb and somewhat into her 2nd finger.  She did try to go for an upper body massage on the 11th but this did not help.  She went to a chiropractor on the 15th who adjusted her but this did not help.  In addition to the numbness and tingling that she is experiencing, she has also noticed weakness in her right arm and is having trouble losing her  and pushing open doors.  She denies any neck pain.  She denies any other numbness or paresthesias.  Of note, she is right handed.  She did make an appointment with neurologist is scheduled to see them in a couple of months.  Also of note, in July of 2020, she had an EMG that showed bilateral mild-to-moderate carpal tunnel syndrome and right greater than left ulnar neuropathy but this improved after she made some adjustments at work and is working more ergonomically friendly    She also believes she has a UTI.  For past 3-4 days, she has been having burning with urination and urinary frequency urgency and hesitancy.  She denies any nausea, vomiting, fevers, chills or flank pain.  She did have 1 prior UTI 6 months ago.  She is on her menstrual cycle currently    Review of Systems   Constitutional:  Negative for appetite change, chills, diaphoresis, fatigue, fever and unexpected weight change.   HENT:  Negative for congestion, ear pain, postnasal drip, rhinorrhea, sinus pressure, sneezing, sore throat and trouble  swallowing.    Eyes:  Negative for pain, discharge and visual disturbance.   Respiratory:  Negative for cough, chest tightness, shortness of breath and wheezing.    Cardiovascular:  Negative for chest pain, palpitations and leg swelling.   Gastrointestinal:  Negative for abdominal distention, abdominal pain, blood in stool, constipation, diarrhea, nausea and vomiting.   Musculoskeletal:  Negative for neck pain.   Skin:  Negative for rash.   Neurological:  Positive for numbness (RUE per HPI).       Objective:      Physical Exam  Constitutional:       General: She is not in acute distress.     Appearance: Normal appearance. She is well-developed.   HENT:      Head: Normocephalic and atraumatic.      Right Ear: Hearing, tympanic membrane, ear canal and external ear normal.      Left Ear: Hearing, tympanic membrane, ear canal and external ear normal.      Nose: Nose normal.      Mouth/Throat:      Mouth: No oral lesions.      Pharynx: No oropharyngeal exudate or posterior oropharyngeal erythema.   Eyes:      General: Lids are normal. No scleral icterus.     Extraocular Movements: Extraocular movements intact.      Conjunctiva/sclera: Conjunctivae normal.      Pupils: Pupils are equal, round, and reactive to light.   Neck:      Thyroid: No thyroid mass or thyromegaly.      Vascular: No carotid bruit.   Cardiovascular:      Rate and Rhythm: Normal rate and regular rhythm. No extrasystoles are present.     Chest Wall: PMI is not displaced.      Heart sounds: Normal heart sounds. No murmur heard.    No gallop.   Pulmonary:      Effort: Pulmonary effort is normal. No accessory muscle usage or respiratory distress.      Breath sounds: Normal breath sounds.   Abdominal:      General: Bowel sounds are normal. There is no abdominal bruit.      Palpations: Abdomen is soft.      Tenderness: There is no abdominal tenderness. There is no rebound.   Musculoskeletal:      Cervical back: Normal range of motion and neck supple.  "  Lymphadenopathy:      Head:      Right side of head: No submental or submandibular adenopathy.      Left side of head: No submental or submandibular adenopathy.      Cervical:      Right cervical: No superficial, deep or posterior cervical adenopathy.     Left cervical: No superficial, deep or posterior cervical adenopathy.      Upper Body:      Right upper body: No supraclavicular adenopathy.      Left upper body: No supraclavicular adenopathy.   Skin:     General: Skin is warm and dry.   Neurological:      Mental Status: She is alert and oriented to person, place, and time.      Cranial Nerves: Cranial nerves 2-12 are intact.      Sensory: Sensation is intact.      Comments: 4+/5 strength in RUE  5/5 strength in LUE     Blood pressure 102/68, pulse 60, temperature 98.2 °F (36.8 °C), temperature source Temporal, resp. rate 16, height 5' 6" (1.676 m), weight 74.8 kg (164 lb 14.5 oz), last menstrual period 02/27/2023, SpO2 99 %.Body mass index is 26.62 kg/m².            A/P:  1) C6 cervical radiculopathy.  Acute.  We are going to check an MRI of the neck.  As she has history of claustrophobia, I am going to give her Xanax to use prior to the MRI.  Once this is back, we will determine how to proceed.  If she develops any new or worsening symptoms as we proceed with our evaluation, she will let me know  2) UTI.  Acute.  I am going to treat her with Septra.  If this does not help or if she develops any new worsening symptoms, she will let me know.    "

## 2023-03-05 ENCOUNTER — PATIENT MESSAGE (OUTPATIENT)
Dept: FAMILY MEDICINE | Facility: CLINIC | Age: 23
End: 2023-03-05
Payer: COMMERCIAL

## 2023-03-05 DIAGNOSIS — R20.2 PARESTHESIAS: Primary | ICD-10-CM

## 2023-03-07 ENCOUNTER — PATIENT MESSAGE (OUTPATIENT)
Dept: FAMILY MEDICINE | Facility: CLINIC | Age: 23
End: 2023-03-07
Payer: COMMERCIAL

## 2024-03-14 ENCOUNTER — TELEPHONE (OUTPATIENT)
Dept: MATERNAL FETAL MEDICINE | Facility: CLINIC | Age: 24
End: 2024-03-14
Payer: MEDICAID

## 2024-03-14 DIAGNOSIS — Z36.89 ENCOUNTER FOR FETAL ANATOMIC SURVEY: Primary | ICD-10-CM

## 2024-03-14 DIAGNOSIS — Q27.0 SINGLE UMBILICAL ARTERY: ICD-10-CM

## 2024-03-14 NOTE — TELEPHONE ENCOUNTER
Attempted to contact number listed. Left vm to call back to schedule appt at [phone], with [facility], [place], [place].

## 2024-03-28 ENCOUNTER — TELEPHONE (OUTPATIENT)
Dept: MATERNAL FETAL MEDICINE | Facility: CLINIC | Age: 24
End: 2024-03-28
Payer: MEDICAID

## 2024-03-28 NOTE — TELEPHONE ENCOUNTER
Pt called office.  Pt verified self by name, .   Pt states she is changing jobs and monday only day she can come. RN has no availabilty for this Monday at our [place] office. Pt offered appt in [place] for Monday. Pt ok for appt at our [place] clinic.    RN advised with fetal nurse if oK to book slot at 1240pm on Monday. Confirmed.      Accepted appt time/date.   Pt verbalized understanding. Agreed to POC w intent to comply.

## 2024-04-01 ENCOUNTER — OFFICE VISIT (OUTPATIENT)
Dept: MATERNAL FETAL MEDICINE | Facility: CLINIC | Age: 24
End: 2024-04-01
Payer: MEDICAID

## 2024-04-01 ENCOUNTER — PROCEDURE VISIT (OUTPATIENT)
Dept: MATERNAL FETAL MEDICINE | Facility: CLINIC | Age: 24
End: 2024-04-01
Attending: OBSTETRICS & GYNECOLOGY
Payer: MEDICAID

## 2024-04-01 VITALS
SYSTOLIC BLOOD PRESSURE: 109 MMHG | BODY MASS INDEX: 29.76 KG/M2 | HEIGHT: 66 IN | DIASTOLIC BLOOD PRESSURE: 60 MMHG | WEIGHT: 185.19 LBS

## 2024-04-01 DIAGNOSIS — Q27.0 SINGLE UMBILICAL ARTERY: ICD-10-CM

## 2024-04-01 DIAGNOSIS — Z36.89 ENCOUNTER FOR FETAL ANATOMIC SURVEY: ICD-10-CM

## 2024-04-01 DIAGNOSIS — Z36.2 ENCOUNTER FOR FOLLOW-UP ULTRASOUND OF FETAL ANATOMY: Primary | ICD-10-CM

## 2024-04-01 DIAGNOSIS — O09.899 SINGLE UMBILICAL ARTERY AFFECTING MANAGEMENT OF MOTHER IN SINGLETON PREGNANCY, ANTEPARTUM: Primary | ICD-10-CM

## 2024-04-01 PROCEDURE — 99212 OFFICE O/P EST SF 10 MIN: CPT | Mod: PBBFAC,TH,25 | Performed by: OBSTETRICS & GYNECOLOGY

## 2024-04-01 PROCEDURE — 99999 PR PBB SHADOW E&M-EST. PATIENT-LVL II: CPT | Mod: PBBFAC,,, | Performed by: OBSTETRICS & GYNECOLOGY

## 2024-04-01 PROCEDURE — 76811 OB US DETAILED SNGL FETUS: CPT | Mod: 26,S$PBB,, | Performed by: OBSTETRICS & GYNECOLOGY

## 2024-04-01 PROCEDURE — 99203 OFFICE O/P NEW LOW 30 MIN: CPT | Mod: 25,S$PBB,TH, | Performed by: OBSTETRICS & GYNECOLOGY

## 2024-04-01 PROCEDURE — 3074F SYST BP LT 130 MM HG: CPT | Mod: CPTII,,, | Performed by: OBSTETRICS & GYNECOLOGY

## 2024-04-01 PROCEDURE — 1159F MED LIST DOCD IN RCRD: CPT | Mod: CPTII,,, | Performed by: OBSTETRICS & GYNECOLOGY

## 2024-04-01 PROCEDURE — 3008F BODY MASS INDEX DOCD: CPT | Mod: CPTII,,, | Performed by: OBSTETRICS & GYNECOLOGY

## 2024-04-01 PROCEDURE — 76811 OB US DETAILED SNGL FETUS: CPT | Mod: PBBFAC | Performed by: OBSTETRICS & GYNECOLOGY

## 2024-04-01 PROCEDURE — 3078F DIAST BP <80 MM HG: CPT | Mod: CPTII,,, | Performed by: OBSTETRICS & GYNECOLOGY

## 2024-04-01 NOTE — PROGRESS NOTES
Consultation  ==========    30 minutes of total time was spent on the encounter which included face-to-face time and non-face-to-face time preparing to see the patient,  obtaining and or reviewing separately obtained history, documenting clinical information in the electronic or other health record, independently  interpreting results (not separately reported) and communicating results to the patient, her sister, and her mother or care coordination (not  separately reported).    Referring MD or midlevel practitioner: Dr. Ramírez    Indication for consultation: 2VC noted on outside scan    The patient was counseled concerning the findings of a 2 vessel umbilical cord. We discussed that the umbilical cord normally contains two  arteries and one vein. Approximately 1% of infants may have a single umbilical artery. This has been associated with a ~10 - 15% incidence of  associated anomalies, especially of the cardiovascular or renal systems. In the absence of other associated anomalies, advanced maternal  age, or abnormal serum serology, the risk of chromosome abnormalities is not believed to be significantly increased. We discussed the  possibility of an increased risk of growth restriction, and follow-up ultrasound for fetal growth is recommended at ~32 weeks. If fetal growth is  normal, there is no indication for additional  surveillance or any alteration in usual obstetric care.  On exam today, no anomalies are identified; however, some cardiac structures were unable to be adequately visualized due to fetal position. A  f/u exam in 4 - 6 weeks will be scheduled to complete the anatomic survey.    Ultrasound Impression  =========    A detailed fetal anatomic ultrasound examination was performed for the following high risk indication: 2 vessel cord.  The two vessel umbilical cord is confirmed.  No fetal structural malformations are identified; however, fetal imaging is incomplete today.  A follow-up study  will be scheduled to complete the fetal anatomic survey.  Fetal size today is consistent with established gestational age.  Cervical length by TA scanning is normal.  Placental location is posterior without evidence of previa.

## 2024-05-01 ENCOUNTER — PROCEDURE VISIT (OUTPATIENT)
Dept: MATERNAL FETAL MEDICINE | Facility: CLINIC | Age: 24
End: 2024-05-01
Payer: MEDICAID

## 2024-05-01 ENCOUNTER — PATIENT MESSAGE (OUTPATIENT)
Dept: MATERNAL FETAL MEDICINE | Facility: CLINIC | Age: 24
End: 2024-05-01

## 2024-05-01 DIAGNOSIS — Z36.2 ENCOUNTER FOR FOLLOW-UP ULTRASOUND OF FETAL ANATOMY: ICD-10-CM

## 2024-05-01 PROCEDURE — 76816 OB US FOLLOW-UP PER FETUS: CPT | Mod: PBBFAC | Performed by: OBSTETRICS & GYNECOLOGY

## 2024-07-09 ENCOUNTER — PATIENT MESSAGE (OUTPATIENT)
Dept: ADMINISTRATIVE | Facility: HOSPITAL | Age: 24
End: 2024-07-09
Payer: MEDICAID